# Patient Record
Sex: MALE | Race: WHITE
[De-identification: names, ages, dates, MRNs, and addresses within clinical notes are randomized per-mention and may not be internally consistent; named-entity substitution may affect disease eponyms.]

---

## 2019-07-22 ENCOUNTER — HOSPITAL ENCOUNTER (INPATIENT)
Dept: HOSPITAL 61 - ER | Age: 39
LOS: 1 days | Discharge: LEFT BEFORE DISCHARGE | DRG: 915 | End: 2019-07-23
Attending: INTERNAL MEDICINE | Admitting: INTERNAL MEDICINE
Payer: SELF-PAY

## 2019-07-22 VITALS — SYSTOLIC BLOOD PRESSURE: 131 MMHG | DIASTOLIC BLOOD PRESSURE: 80 MMHG

## 2019-07-22 VITALS — SYSTOLIC BLOOD PRESSURE: 143 MMHG | DIASTOLIC BLOOD PRESSURE: 78 MMHG

## 2019-07-22 VITALS — WEIGHT: 265 LBS | HEIGHT: 74 IN | BODY MASS INDEX: 34.01 KG/M2

## 2019-07-22 DIAGNOSIS — D72.829: ICD-10-CM

## 2019-07-22 DIAGNOSIS — T78.2XXA: Primary | ICD-10-CM
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
## 2019-07-22 DIAGNOSIS — N17.0: ICD-10-CM

## 2019-07-22 DIAGNOSIS — Z82.49: ICD-10-CM

## 2019-07-22 DIAGNOSIS — Z53.21: ICD-10-CM

## 2019-07-22 DIAGNOSIS — T78.1XXA: ICD-10-CM

## 2019-07-22 DIAGNOSIS — E66.3: ICD-10-CM

## 2019-07-22 DIAGNOSIS — L50.0: ICD-10-CM

## 2019-07-22 DIAGNOSIS — G89.29: ICD-10-CM

## 2019-07-22 DIAGNOSIS — M54.5: ICD-10-CM

## 2019-07-22 DIAGNOSIS — F17.210: ICD-10-CM

## 2019-07-22 LAB
% BANDS: 9 % (ref 0–9)
% LYMPHS: 5 % (ref 24–48)
% MONOS: 1 % (ref 0–10)
% SEGS: 85 % (ref 35–66)
ALBUMIN SERPL-MCNC: 3.7 G/DL (ref 3.4–5)
ALBUMIN/GLOB SERPL: 1.1 {RATIO} (ref 1–1.7)
ALP SERPL-CCNC: 79 U/L (ref 46–116)
ALT SERPL-CCNC: 37 U/L (ref 16–63)
ANION GAP SERPL CALC-SCNC: 10 MMOL/L (ref 6–14)
AST SERPL-CCNC: 21 U/L (ref 15–37)
BASOPHILS # BLD AUTO: 0 X10^3/UL (ref 0–0.2)
BASOPHILS NFR BLD: 0 % (ref 0–3)
BILIRUB SERPL-MCNC: 0.4 MG/DL (ref 0.2–1)
BUN SERPL-MCNC: 13 MG/DL (ref 8–26)
BUN/CREAT SERPL: 9 (ref 6–20)
CALCIUM SERPL-MCNC: 8.8 MG/DL (ref 8.5–10.1)
CHLORIDE SERPL-SCNC: 109 MMOL/L (ref 98–107)
CO2 SERPL-SCNC: 24 MMOL/L (ref 21–32)
CREAT SERPL-MCNC: 1.4 MG/DL (ref 0.7–1.3)
EOSINOPHIL NFR BLD: 0 % (ref 0–3)
EOSINOPHIL NFR BLD: 0 X10^3/UL (ref 0–0.7)
ERYTHROCYTE [DISTWIDTH] IN BLOOD BY AUTOMATED COUNT: 12.3 % (ref 11.5–14.5)
GFR SERPLBLD BASED ON 1.73 SQ M-ARVRAT: 55.8 ML/MIN
GLOBULIN SER-MCNC: 3.4 G/DL (ref 2.2–3.8)
GLUCOSE SERPL-MCNC: 119 MG/DL (ref 70–99)
HCT VFR BLD CALC: 46.5 % (ref 39–53)
HGB BLD-MCNC: 16.2 G/DL (ref 13–17.5)
LYMPHOCYTES # BLD: 1.5 X10^3/UL (ref 1–4.8)
LYMPHOCYTES NFR BLD AUTO: 6 % (ref 24–48)
MCH RBC QN AUTO: 34 PG (ref 25–35)
MCHC RBC AUTO-ENTMCNC: 35 G/DL (ref 31–37)
MCV RBC AUTO: 97 FL (ref 79–100)
MONO #: 0.6 X10^3/UL (ref 0–1.1)
MONOCYTES NFR BLD: 2 % (ref 0–9)
NEUT #: 22.8 X10^3/UL (ref 1.8–7.7)
NEUTROPHILS NFR BLD AUTO: 91 % (ref 31–73)
PLATELET # BLD AUTO: 222 X10^3/UL (ref 140–400)
PLATELET # BLD EST: ADEQUATE 10*3/UL
POTASSIUM SERPL-SCNC: 4.5 MMOL/L (ref 3.5–5.1)
PROT SERPL-MCNC: 7.1 G/DL (ref 6.4–8.2)
RBC # BLD AUTO: 4.81 X10^6/UL (ref 4.3–5.7)
SODIUM SERPL-SCNC: 143 MMOL/L (ref 136–145)
WBC # BLD AUTO: 24.9 X10^3/UL (ref 4–11)

## 2019-07-22 PROCEDURE — 96374 THER/PROPH/DIAG INJ IV PUSH: CPT

## 2019-07-22 PROCEDURE — 85007 BL SMEAR W/DIFF WBC COUNT: CPT

## 2019-07-22 PROCEDURE — 80048 BASIC METABOLIC PNL TOTAL CA: CPT

## 2019-07-22 PROCEDURE — 96372 THER/PROPH/DIAG INJ SC/IM: CPT

## 2019-07-22 PROCEDURE — 96375 TX/PRO/DX INJ NEW DRUG ADDON: CPT

## 2019-07-22 PROCEDURE — 93005 ELECTROCARDIOGRAM TRACING: CPT

## 2019-07-22 PROCEDURE — 85025 COMPLETE CBC W/AUTO DIFF WBC: CPT

## 2019-07-22 PROCEDURE — 80053 COMPREHEN METABOLIC PANEL: CPT

## 2019-07-22 PROCEDURE — 96361 HYDRATE IV INFUSION ADD-ON: CPT

## 2019-07-22 PROCEDURE — 36415 COLL VENOUS BLD VENIPUNCTURE: CPT

## 2019-07-22 PROCEDURE — 81001 URINALYSIS AUTO W/SCOPE: CPT

## 2019-07-22 NOTE — EKG
[facility]

              [address] [place] [address]

Test Date:    2019               Test Time:    14:24:46

Pat Name:     MAGDA MORALES            Department:   

Patient ID:   [id number]           Room:          

Gender:                               Technician:   

:          1980               Requested By: LEORA WELCH

Order Number: [id number]           Reading MD:     

                                 Measurements

Intervals                              Axis          

Rate:         90                       P:            45

IA:           152                      QRS:          42

QRSD:         78                       T:            -24

QT:           350                                    

QTc:          432                                    

                           Interpretive Statements

SINUS RHYTHM

QRS(T) CONTOUR ABNORMALITY

CONSIDER ANTEROSEPTAL MYOCARDIAL DAMAGE

T ABNORMALITY IN INFERIOR LEADS

ABNORMAL ECG

RI6.01          Unconfirmed report

No previous ECG available for comparison

## 2019-07-22 NOTE — PDOC1
History and Physical


Date of Admission


Date of Admission


DATE: 7/22/19 


TIME: 18:25





Identification/Chief Complaint


Chief Complaint


Allergic reaction





Source


Source:  Patient





History of Present Illness


History of Present Illness


Mr España is a 38yo M w/ PMHx chronic back pain who presents to ER via EMS 

following an allergic reaction.





Per EMS they were called as patient developed hives and had drowsiness, upper 

lip and facial swelling and some confusion following onset of symptoms while he 

was driving down the road after lunch. On arrival they found patient to be 

hypotensive and tachycardic. They administered 0.3 IM epinephrine and 50 mg IV 

Benadryl. With minimal improvement they administered 10 mcg epinephrine IV and 

then had improved vital signs. On arrival patient is A&Ox3 denying 

CP/SOA/difficulty swallowing. Patient has diffuse hives on the lower abdomen and

extremities.





Patient's airway is patent with no pharyngeal swelling or erythema-uvula 

midline. Patient is speaking in clear full sentences.





Patient reports he felt fine prior to eating tacos at lunch and then while they 

were driving had sudden onset of the symptoms.





He works as an  in [place] and notes the rash began on his abdomen 

along where his utility belt is normally. He also notes similar symptoms on the 

4th of July that were much milder with minimal facial swelling and abdominal 

rash.





Notable for large leukocytosis and Cr elevated with no history of renal disease.





Past Medical History


Cardiovascular:  No pertinent hx


Pulmonary:  No pertinent hx


GI:  No pertinent hx


Heme/Onc:  No pertinent hx


Hepatobiliary:  No pertinent hx


Psych:  No pertinent hx


Musculoskeletal:   low back pain


Rheumatologic:  No pertinent hx


Infectious disease:  No pertinent hx


ENT:  No pertinent hx


Renal/:  No pertinent hx


Endocrine:  No pertinent hx


Dermatology:  No pertinent hx





Past Surgical History


Past Surgical History:  Other (Lumbar fixation)





Family History


Family History:  High Cholestrol, Hypertension





Social History


Smoke:  <1 pack per day


ALCOHOL:  rare


Drugs:  None





Current Medications


Current Medications





Current Medications


Methylprednisolone Sodium Succinate (SOLU-Medrol 125MG VIAL) 125 mg 1X  ONCE IV 

Last administered on 7/22/19at 14:30;  Start 7/22/19 at 14:30;  Stop 7/22/19 at 

14:41;  Status DC


Famotidine (Pepcid Vial) 40 mg 1X  ONCE IVP  Last administered on 7/22/19at 

14:30;  Start 7/22/19 at 14:30;  Stop 7/22/19 at 14:41;  Status DC


Sodium Chloride 1,000 ml @  1,000 mls/hr 1X  ONCE IV  Last administered on 

7/22/19at 14:31;  Start 7/22/19 at 14:30;  Stop 7/22/19 at 15:29;  Status DC


Epinephrine HCl (EPINEPHrine SYRINGE) 0.3 mg 1X  ONCE IM  Last administered on 

7/22/19at 18:04;  Start 7/22/19 at 17:45;  Stop 7/22/19 at 18:06;  Status DC


Epinephrine HCl (Adrenalin) 0.3 mg 1X  ONCE IM ;  Start 7/22/19 at 18:15;  Stop 

7/22/19 at 18:16;  Status DC





Allergies


Allergies:  


Coded Allergies:  


     No Known Drug Allergies (Unverified , 7/22/19)





ROS


General:  YES: Fatigue, Malaise; 


   No: Chills, Night Sweats, Appetite, Other


PSYCHOLOGICAL ROS:  No: Anxiety, Behavioral Disorder, Concentration difficultie,

Decreased libido, Depression, Disorientation, Hallucinations, Hostility, 

Irritablity, Memory difficulties, Mood Swings, Obsessive thoughts, Physical 

abuse, Sexual abuse, Sleep disturbances, Suicidal ideation, Other


Eyes:  No Blurry vision, No Decreased vision, No Double vision, No Dry eyes, No 

Excessive tearing, No Eye Pain, No Itchy Eyes, No Loss of vision, No 

Photophobia, No Scotomata, No Uses contacts, No Uses glasses, No Other


HEENT:  No: Heacaches, Visual Changes, Hearing change, Nasal congestion, Nasal 

discharge, Oral lesions, Sinus pain, Sore Throat, Epistaxis, Sneezing, Snoring, 

Tinnitus, Vertigo, Vocal changes, Other


ALLERGY AND IMMUNOLOGY:  YES: Hives, Insect Bite Sensitivity, Itchy/Watery Eyes


Hematological and Lymphatic:  No: Bleeding Problems, Blood Clots, Blood 

Transfusions, Brusing, Night Sweats, Pallor, Swollen Lymph Nodes, Other


ENDOCRINE:  No: Breast Changes, Galactorrhea, Hair Pattern Changes, Hot Flashes,

Malaise/lethargy, Mood Swings, Palpitations, Polydipsia/polyuria, Skin Changes, 

Temperature Intolerance, Unexpected Weight Changes, Other


Breast:  No New/Changing Breast Lumps, No Nipple changes, No Nipple discharge, 

No Other


Respiratory:  No: Cough, Hemoptysis, Orthopnea, Pleuritic Pain, Shortness of 

breath, SOB with excertion, Sputum Changes, Stridor, Tachypnea, Wheezing, Other


Cardiovascular:  No Chest Pain, No Palpitations, No Orthopnea, No Paroxysmal 

Noc. Dyspnea, No Edema, No Lt Headedness, No Other


Gastrointestinal:  No Nausea, No Vomiting, No Abdominal Pain, No Diarrhea, No C

onstipation, No Melena, No Hematochezia, No Other


Genitourinary:  No Dysuria, No Frequency, No Incontinence, No Hematuria, No 

Retention, No Discharge, No Urgency, No Pain, No Flank Pain, No Other, No , No ,

No , No , No , No , No 


Musculoskeletal:  No Gait Disturbance, No Joint Pain, No Joint Stiffness, No 

Joint Swelling, No Muscle Pain, No Muscular Weakness, No Pain In:, No Swelling 

In:, No Other


Neurological:  No Behavorial Changes, No Bowel/Bladder ControlChng, No 

Confusion, No Dizziness, No Gait Disturbance, No Headaches, No Impaired 

Coord/balance, No Memory Loss, No Numbness/Tingling, No Seizures, No Speech 

Problems, No Tremors, No Visual Changes, No Weakness, No Other


Skin:  No Dry Skin, No Eczema, No Hair Changes, No Lumps, No Mole Changes, No 

Mottling, No Nail Changes, No Pruritus, No Rash, No Skin Lesion Changes, No 

Other, No Acne





Physical Exam


General:  Alert, Oriented X3, Cooperative, No acute distress


HEENT:  Atraumatic, PERRLA, EOMI, Mucous membr. moist/pink, Other (Upper lip 

slightly swollen)


Lungs:  Clear to auscultation, Normal air movement


Heart:  S1S2, RRR, no gallops, no murmurs


Abdomen:  Normal bowel sounds, Soft, No tenderness, No hepatosplenomegaly, No 

masses


Male Genitals Exam:  normal genitalia


Rectal Exam:  not examined


Extremities:  No clubbing, No cyanosis, No edema, Normal pulses, No 

tenderness/swelling


Skin:  No breakdown, No significant lesion, Other (Confluent raised hives on 

bilateral arms and abdomen, neck, thighs and back)


Neuro:  Normal gait, Normal speech, Strength at 5/5 X4 ext, Normal tone, 

Sensation intact, Cranial nerves 3-12 NL, Reflexes 2+


Psych/Mental Status:  Mental status NL, Mood NL





Vitals


Vitals





Vital Signs








  Date Time  Temp Pulse Resp B/P (MAP) Pulse Ox O2 Delivery O2 Flow Rate FiO2


 


7/22/19 16:15  78 17 146/85 (105) 99 Room Air  


 


7/22/19 15:00       2.0 


 


7/22/19 14:17 98.7       





 98.7       











VTE Prophylaxis Ordered


VTE Prophylaxis Devices:  Yes


VTE Pharmacological Prophylaxi:  No





Assessment/Plan


Assessment/Plan


A/P:


Hives - likely a food allergy, though [place] 

[place] he may have a mold or fungal allergy he is developing. He is rather 

insistent about leaving, I have advised him he needs allergist follow up and 

inpatient overnight as he had a severe reaction. Start montelukast nightly.


Anaphylaxis - noted outside the hospital, with hives recurring, given 1x 0.3mg 

IM epinephrine in ED, IV benadryl, solumedrol 125mg, and pepcid IV


Chronic lower back pain - heating pad, tylenol


Smoker - he will quit cold turkey, has started vaping minimally.


Overweight - counseled on weight loss, diet, exercise


Leukocytosis - likely neutrophil demargination 2/2 epinephrine and steroid 

administration, will repeat CBC in AM


CLYDE - likely vasomotor nephropathy from low PO intake, epi administration, will 

hydrate overnight with LR. repeat BMP in AM





FEN - General diet


PPX - pepcid, ambulatory, low DVT risk


FULL CODE


Inpatient for anaphylaxis. May be able to d/c in next 24-48 hours.











IAIN SHEN MD        Jul 22, 2019 18:27

## 2019-07-22 NOTE — PHYS DOC
Adult General


Chief Complaint


Chief Complaint:  ALLERGIC REACTION





HPI


HPI





39-year-old male presents to ER via EMS following an allergic reaction to 

possible dairy food. Per EMS they were called as patient developed hives and had

decreased LOC following onset of symptoms while he was driving down the road 

after lunch. On arrival they found patient to be hypotensive and tachycardic. 

They administered 0.3 IM epinephrine and 50 mg IV Benadryl. With minimal 

improvement they administered 10 mcg epinephrine IV and then had improved vital 

signs. On arrival patient is A&Ox3 denying CP/SOA/difficulty swallowing. Patient

has diffuse hives on the lower abdomen and extremities. Patient's airway is 

patent with no pharyngeal swelling or erythema-uvula midline. Patient is 

speaking in clear full sentences. Patient reports he felt fine prior to eating 

tacos at lunch and then while they were driving had sudden onset of the 

symptoms.





Review of Systems


Review of Systems





Constitutional: Denies fever or chills. Reports generalized fatigue


Eyes: Denies change in visual acuity, redness, or eye pain []


HENT: Denies throat pain/swelling


Respiratory: Denies cough or shortness of breath []


Cardiovascular: Denies CP/tightness


GI: Denies abdominal pain, nausea, vomiting, bloody stools or diarrhea []


: Denies urinary sxs


Musculoskeletal: Denies back/neck pain or joint pain []


Integument: Reports hives


Neurologic: Denies headache, focal weakness or sensory changes []





All other systems were reviewed and found to be within normal limits, except as 

documented in this note.





Current Medications


Current Medications





Current Medications








 Medications


  (Trade)  Dose


 Ordered  Sig/Ayanna  Start Time


 Stop Time Status Last Admin


Dose Admin


 


 Famotidine


  (Pepcid Vial)  40 mg  1X  ONCE  7/22/19 14:30


 7/22/19 14:41 DC 7/22/19 14:30


40 MG


 


 Methylprednisolone


 Sodium Succinate


  (SOLU-Medrol


 125MG VIAL)  125 mg  1X  ONCE  7/22/19 14:30


 7/22/19 14:41 DC 7/22/19 14:30


125 MG


 


 Sodium Chloride  1,000 ml @ 


 1,000 mls/hr  1X  ONCE  7/22/19 14:30


 7/22/19 15:29 DC 7/22/19 14:31


1,000 MLS/HR











Allergies


Allergies





Allergies








Coded Allergies Type Severity Reaction Last Updated Verified


 


  No Known Drug Allergies    7/22/19 No











Physical Exam


Physical Exam





Constitutional: Well developed, well nourished, no acute distress, non-toxic 

appearance. []


HENT: Normocephalic, atraumatic, bilateral external ears normal, oropharynx 

moist, no oral exudates, nose normal. []


Eyes: PERRLA, EOMI, conjunctiva normal, no discharge. [] 


Neck: Normal range of motion, no tenderness, supple, no stridor. [] 


Cardiovascular:Heart rate regular rhythm, no murmur []


Lungs & Thorax:  Bilateral breath sounds clear to auscultation []


Abdomen: Bowel sounds normal, soft, no tenderness, no masses, no pulsatile 

masses. [] 


Skin: Warm, dry, no erythema, no rash. [] 


Back: No tenderness, no CVA tenderness. [] 


Extremities: No tenderness, no cyanosis, no clubbing, ROM intact, no edema. [] 


Neurologic: Alert and oriented X 3, normal motor function, normal sensory 

function, no focal deficits noted. []


Psychologic: Affect normal, judgement normal, mood normal. []





Current Patient Data


Vital Signs





                                   Vital Signs








  Date Time  Temp Pulse Resp B/P (MAP) Pulse Ox O2 Delivery O2 Flow Rate FiO2


 


7/22/19 16:15  78 17 146/85 (105) 99 Room Air  


 


7/22/19 15:00       2.0 


 


7/22/19 14:17 98.7       





 98.7       











EKG


EKG


EKG obtained 07/22/19 at 1424


Interpreted by Dr. Schrader





Sinus rhythm


Rate 90


No STEMI





Radiology/Procedures


Radiology/Procedures


[]





Course & Med Decision Making


Course & Med Decision Making


Pertinent Labs reviewed. (See chart for details)





[]





Dragon Disclaimer


Dragon Disclaimer


This electronic medical record was generated, in whole or in part, using a voice

 recognition dictation system.





Departure


Departure


Impression:  


   Primary Impression:  


   Allergic reaction


Disposition:  09 ADMITTED AS INPATIENT


Admitting Physician:  HIMS


Condition:  STABLE











ADENLEORA SCHERER APRLESLYE          Jul 22, 2019 14:31

## 2019-07-23 VITALS — DIASTOLIC BLOOD PRESSURE: 69 MMHG | SYSTOLIC BLOOD PRESSURE: 131 MMHG

## 2019-07-23 LAB
ANION GAP SERPL CALC-SCNC: 9 MMOL/L (ref 6–14)
APTT PPP: YELLOW S
BACTERIA #/AREA URNS HPF: 0 /HPF
BASOPHILS # BLD AUTO: 0 X10^3/UL (ref 0–0.2)
BASOPHILS NFR BLD: 0 % (ref 0–3)
BILIRUB UR QL STRIP: NEGATIVE
BUN SERPL-MCNC: 14 MG/DL (ref 8–26)
CALCIUM SERPL-MCNC: 8.8 MG/DL (ref 8.5–10.1)
CHLORIDE SERPL-SCNC: 107 MMOL/L (ref 98–107)
CO2 SERPL-SCNC: 26 MMOL/L (ref 21–32)
CREAT SERPL-MCNC: 1 MG/DL (ref 0.7–1.3)
EOSINOPHIL NFR BLD: 0 % (ref 0–3)
EOSINOPHIL NFR BLD: 0 X10^3/UL (ref 0–0.7)
ERYTHROCYTE [DISTWIDTH] IN BLOOD BY AUTOMATED COUNT: 12.3 % (ref 11.5–14.5)
FIBRINOGEN PPP-MCNC: CLEAR MG/DL
GFR SERPLBLD BASED ON 1.73 SQ M-ARVRAT: 82.3 ML/MIN
GLUCOSE SERPL-MCNC: 119 MG/DL (ref 70–99)
HCT VFR BLD CALC: 42.5 % (ref 39–53)
HGB BLD-MCNC: 14.8 G/DL (ref 13–17.5)
LYMPHOCYTES # BLD: 0.8 X10^3/UL (ref 1–4.8)
LYMPHOCYTES NFR BLD AUTO: 5 % (ref 24–48)
MCH RBC QN AUTO: 34 PG (ref 25–35)
MCHC RBC AUTO-ENTMCNC: 35 G/DL (ref 31–37)
MCV RBC AUTO: 98 FL (ref 79–100)
MONO #: 0.2 X10^3/UL (ref 0–1.1)
MONOCYTES NFR BLD: 1 % (ref 0–9)
NEUT #: 15.5 X10^3/UL (ref 1.8–7.7)
NEUTROPHILS NFR BLD AUTO: 94 % (ref 31–73)
NITRITE UR QL STRIP: NEGATIVE
PH UR STRIP: 5.5 [PH]
PLATELET # BLD AUTO: 212 X10^3/UL (ref 140–400)
POTASSIUM SERPL-SCNC: 4.5 MMOL/L (ref 3.5–5.1)
PROT UR STRIP-MCNC: NEGATIVE MG/DL
RBC # BLD AUTO: 4.36 X10^6/UL (ref 4.3–5.7)
RBC #/AREA URNS HPF: (no result) /HPF (ref 0–2)
SODIUM SERPL-SCNC: 142 MMOL/L (ref 136–145)
SQUAMOUS #/AREA URNS LPF: (no result) /LPF
UROBILINOGEN UR-MCNC: 1 MG/DL
WBC # BLD AUTO: 16.5 X10^3/UL (ref 4–11)
WBC #/AREA URNS HPF: (no result) /HPF (ref 0–4)

## 2019-07-23 NOTE — NUR
Pt states having to leave and go to work. Pt rested during the noc with eyes closed. Pt with 
LR at 75 going during the noc. No complaints noted and swelling of face has reduced. Pt 
states feeling better. Pt states he has not missed work in 23 years and does not want to 
start. Pt given prescriptions that Physician wants him to feel and instructed to report to 
Ed for complications. Pt signed AMA paperwork. Peripheral IV removed, pt able to ambulate 
without assistance.

## 2019-07-23 NOTE — PDOC3
Discharge Summary


Visit Information


Date of Admission:  Jul 22, 2019


Date of Discharge:  Jul 23, 2019


Admitting Diagnosis:  Anaphylaxis


Final Diagnosis


Problems


Medical Problems:


(1) Allergic reaction


Status: Acute  











Brief Hospital Course


Allergies





                                    Allergies








Coded Allergies Type Severity Reaction Last Updated Verified


 


  No Known Drug Allergies    7/22/19 No








Vital Signs





Vital Signs








  Date Time  Temp Pulse Resp B/P (MAP) Pulse Ox O2 Delivery O2 Flow Rate FiO2


 


7/23/19 03:35 98.6 75 18 131/69 (89) 100 Room Air  





 98.6       


 


7/22/19 20:00       2.0 








Lab Results





Laboratory Tests








Test


 7/22/19


18:15 7/23/19


03:15 7/23/19


03:20


 


White Blood Count


 24.9 x10^3/uL


(4.0-11.0) 16.5 x10^3/uL


(4.0-11.0) 





 


Red Blood Count


 4.81 x10^6/uL


(4.30-5.70) 4.36 x10^6/uL


(4.30-5.70) 





 


Hemoglobin


 16.2 g/dL


(13.0-17.5) 14.8 g/dL


(13.0-17.5) 





 


Hematocrit


 46.5 %


(39.0-53.0) 42.5 %


(39.0-53.0) 





 


Mean Corpuscular Volume 97 fL ()  98 fL ()  


 


Mean Corpuscular Hemoglobin 34 pg (25-35)  34 pg (25-35)  


 


Mean Corpuscular Hemoglobin


Concent 35 g/dL


(31-37) 35 g/dL


(31-37) 





 


Red Cell Distribution Width


 12.3 %


(11.5-14.5) 12.3 %


(11.5-14.5) 





 


Platelet Count


 222 x10^3/uL


(140-400) 212 x10^3/uL


(140-400) 





 


Neutrophils (%) (Auto) 91 % (31-73)  94 % (31-73)  


 


Lymphocytes (%) (Auto) 6 % (24-48)  5 % (24-48)  


 


Monocytes (%) (Auto) 2 % (0-9)  1 % (0-9)  


 


Eosinophils (%) (Auto) 0 % (0-3)  0 % (0-3)  


 


Basophils (%) (Auto) 0 % (0-3)  0 % (0-3)  


 


Neutrophils # (Auto)


 22.8 x10^3/uL


(1.8-7.7) 15.5 x10^3/uL


(1.8-7.7) 





 


Lymphocytes # (Auto)


 1.5 x10^3/uL


(1.0-4.8) 0.8 x10^3/uL


(1.0-4.8) 





 


Monocytes # (Auto)


 0.6 x10^3/uL


(0.0-1.1) 0.2 x10^3/uL


(0.0-1.1) 





 


Eosinophils # (Auto)


 0.0 x10^3/uL


(0.0-0.7) 0.0 x10^3/uL


(0.0-0.7) 





 


Basophils # (Auto)


 0.0 x10^3/uL


(0.0-0.2) 0.0 x10^3/uL


(0.0-0.2) 





 


Segmented Neutrophils % 85 % (35-66)   


 


Band Neutrophils % 9 % (0-9)   


 


Lymphocytes % 5 % (24-48)   


 


Monocytes % 1 % (0-10)   


 


Platelet Estimate


 Adequate


(ADEQUATE) 


 





 


Sodium Level


 143 mmol/L


(136-145) 142 mmol/L


(136-145) 





 


Potassium Level


 4.5 mmol/L


(3.5-5.1) 4.5 mmol/L


(3.5-5.1) 





 


Chloride Level


 109 mmol/L


() 107 mmol/L


() 





 


Carbon Dioxide Level


 24 mmol/L


(21-32) 26 mmol/L


(21-32) 





 


Anion Gap 10 (6-14)  9 (6-14)  


 


Blood Urea Nitrogen


 13 mg/dL


(8-26) 14 mg/dL


(8-26) 





 


Creatinine


 1.4 mg/dL


(0.7-1.3) 1.0 mg/dL


(0.7-1.3) 





 


Estimated GFR


(Cockcroft-Gault) 55.8 


 82.3 


 





 


BUN/Creatinine Ratio 9 (6-20)   


 


Glucose Level


 119 mg/dL


(70-99) 119 mg/dL


(70-99) 





 


Calcium Level


 8.8 mg/dL


(8.5-10.1) 8.8 mg/dL


(8.5-10.1) 





 


Total Bilirubin


 0.4 mg/dL


(0.2-1.0) 


 





 


Aspartate Amino Transf


(AST/SGOT) 21 U/L (15-37) 


 


 





 


Alanine Aminotransferase


(ALT/SGPT) 37 U/L (16-63) 


 


 





 


Alkaline Phosphatase


 79 U/L


() 


 





 


Total Protein


 7.1 g/dL


(6.4-8.2) 


 





 


Albumin


 3.7 g/dL


(3.4-5.0) 


 





 


Albumin/Globulin Ratio 1.1 (1.0-1.7)   


 


Urine Collection Type   Unknown 


 


Urine Color   Yellow 


 


Urine Clarity   Clear 


 


Urine pH   5.5 


 


Urine Specific Gravity   1.025 


 


Urine Protein


 


 


 Negative mg/dL


(NEG-TRACE)


 


Urine Glucose (UA)


 


 


 Negative mg/dL


(NEG)


 


Urine Ketones (Stick)


 


 


 Negative mg/dL


(NEG)


 


Urine Blood   Negative (NEG) 


 


Urine Nitrite   Negative (NEG) 


 


Urine Bilirubin   Negative (NEG) 


 


Urine Urobilinogen Dipstick


 


 


 1.0 mg/dL (0.2


mg/dL)


 


Urine Leukocyte Esterase   Negative (NEG) 


 


Urine RBC   Occ /HPF (0-2) 


 


Urine WBC   Occ /HPF (0-4) 


 


Urine Squamous Epithelial


Cells 


 


 Occ /LPF 





 


Urine Bacteria   0 /HPF (0-FEW) 


 


Urine Mucus   Mod /LPF 








Laboratory Tests








Test


 7/22/19


18:15 7/23/19


03:15 7/23/19


03:20


 


White Blood Count


 24.9 x10^3/uL


(4.0-11.0) 16.5 x10^3/uL


(4.0-11.0) 





 


Red Blood Count


 4.81 x10^6/uL


(4.30-5.70) 4.36 x10^6/uL


(4.30-5.70) 





 


Hemoglobin


 16.2 g/dL


(13.0-17.5) 14.8 g/dL


(13.0-17.5) 





 


Hematocrit


 46.5 %


(39.0-53.0) 42.5 %


(39.0-53.0) 





 


Mean Corpuscular Volume 97 fL ()  98 fL ()  


 


Mean Corpuscular Hemoglobin 34 pg (25-35)  34 pg (25-35)  


 


Mean Corpuscular Hemoglobin


Concent 35 g/dL


(31-37) 35 g/dL


(31-37) 





 


Red Cell Distribution Width


 12.3 %


(11.5-14.5) 12.3 %


(11.5-14.5) 





 


Platelet Count


 222 x10^3/uL


(140-400) 212 x10^3/uL


(140-400) 





 


Neutrophils (%) (Auto) 91 % (31-73)  94 % (31-73)  


 


Lymphocytes (%) (Auto) 6 % (24-48)  5 % (24-48)  


 


Monocytes (%) (Auto) 2 % (0-9)  1 % (0-9)  


 


Eosinophils (%) (Auto) 0 % (0-3)  0 % (0-3)  


 


Basophils (%) (Auto) 0 % (0-3)  0 % (0-3)  


 


Neutrophils # (Auto)


 22.8 x10^3/uL


(1.8-7.7) 15.5 x10^3/uL


(1.8-7.7) 





 


Lymphocytes # (Auto)


 1.5 x10^3/uL


(1.0-4.8) 0.8 x10^3/uL


(1.0-4.8) 





 


Monocytes # (Auto)


 0.6 x10^3/uL


(0.0-1.1) 0.2 x10^3/uL


(0.0-1.1) 





 


Eosinophils # (Auto)


 0.0 x10^3/uL


(0.0-0.7) 0.0 x10^3/uL


(0.0-0.7) 





 


Basophils # (Auto)


 0.0 x10^3/uL


(0.0-0.2) 0.0 x10^3/uL


(0.0-0.2) 





 


Segmented Neutrophils % 85 % (35-66)   


 


Band Neutrophils % 9 % (0-9)   


 


Lymphocytes % 5 % (24-48)   


 


Monocytes % 1 % (0-10)   


 


Platelet Estimate


 Adequate


(ADEQUATE) 


 





 


Sodium Level


 143 mmol/L


(136-145) 142 mmol/L


(136-145) 





 


Potassium Level


 4.5 mmol/L


(3.5-5.1) 4.5 mmol/L


(3.5-5.1) 





 


Chloride Level


 109 mmol/L


() 107 mmol/L


() 





 


Carbon Dioxide Level


 24 mmol/L


(21-32) 26 mmol/L


(21-32) 





 


Anion Gap 10 (6-14)  9 (6-14)  


 


Blood Urea Nitrogen


 13 mg/dL


(8-26) 14 mg/dL


(8-26) 





 


Creatinine


 1.4 mg/dL


(0.7-1.3) 1.0 mg/dL


(0.7-1.3) 





 


Estimated GFR


(Cockcroft-Gault) 55.8 


 82.3 


 





 


BUN/Creatinine Ratio 9 (6-20)   


 


Glucose Level


 119 mg/dL


(70-99) 119 mg/dL


(70-99) 





 


Calcium Level


 8.8 mg/dL


(8.5-10.1) 8.8 mg/dL


(8.5-10.1) 





 


Total Bilirubin


 0.4 mg/dL


(0.2-1.0) 


 





 


Aspartate Amino Transf


(AST/SGOT) 21 U/L (15-37) 


 


 





 


Alanine Aminotransferase


(ALT/SGPT) 37 U/L (16-63) 


 


 





 


Alkaline Phosphatase


 79 U/L


() 


 





 


Total Protein


 7.1 g/dL


(6.4-8.2) 


 





 


Albumin


 3.7 g/dL


(3.4-5.0) 


 





 


Albumin/Globulin Ratio 1.1 (1.0-1.7)   


 


Urine Collection Type   Unknown 


 


Urine Color   Yellow 


 


Urine Clarity   Clear 


 


Urine pH   5.5 


 


Urine Specific Gravity   1.025 


 


Urine Protein


 


 


 Negative mg/dL


(NEG-TRACE)


 


Urine Glucose (UA)


 


 


 Negative mg/dL


(NEG)


 


Urine Ketones (Stick)


 


 


 Negative mg/dL


(NEG)


 


Urine Blood   Negative (NEG) 


 


Urine Nitrite   Negative (NEG) 


 


Urine Bilirubin   Negative (NEG) 


 


Urine Urobilinogen Dipstick


 


 


 1.0 mg/dL (0.2


mg/dL)


 


Urine Leukocyte Esterase   Negative (NEG) 


 


Urine RBC   Occ /HPF (0-2) 


 


Urine WBC   Occ /HPF (0-4) 


 


Urine Squamous Epithelial


Cells 


 


 Occ /LPF 





 


Urine Bacteria   0 /HPF (0-FEW) 


 


Urine Mucus   Mod /LPF 








Brief Hospital Course


Mr España is a 40yo M w/ PMHx chronic back pain who presented to ER via EMS 

following an allergic reaction.


Per EMS they were called as patient developed hives and had drowsiness, upper 

lip and facial swelling and some confusion following onset of symptoms while he 

was driving down the road after lunch. On arrival they found patient to be 

hypotensive and tachycardic. They administered 0.3 IM epinephrine and 50 mg IV 

Benadryl. With minimal improvement they administered 10 mcg epinephrine IV and 

then had improved vital signs. On arrival patient was A&Ox3 denying 

CP/SOA/difficulty swallowing. Patient had diffuse hives on the lower abdomen and

extremities. Daughter and wife bedsid.





Patient's airway was patent with no pharyngeal swelling or erythema-uvula 

midline. Patient was speaking in clear full sentences.





Patient reported he felt fine prior to eating tacos at lunch and then while 

driving had sudden onset of the symptoms.





He works as an  in [place] and noted the rash began on his abdomen 

along where his utility belt is normally. He also noted similar symptoms on the 

4th of July that were much milder with minimal facial swelling and abdominal 

rash.





Notable for large leukocytosis and Cr elevated with no history of renal disease.





Overnight he continued stable, no further problems and he left the hospital AMA 

at 0500 to leave for work.





A/P:


Hives - likely a food allergy, though [place] he may have a mold or fungal allergy he is developing. He is rather 

insistent about leaving, I have advised him he needs allergist follow up and 

inpatient overnight as he had a severe reaction. Start montelukast nightly.


Anaphylaxis - noted outside the hospital, with hives recurring, given 1x 0.3mg 

IM epinephrine in ED, IV benadryl, solumedrol 125mg, and pepcid IV


Chronic lower back pain - heating pad, tylenol


Smoker - he will quit cold turkey, has started vaping minimally.


Overweight - counseled on weight loss, diet, exercise


Leukocytosis - likely neutrophil demargination 2/2 epinephrine and steroid 

administration, will repeat CBC in AM


CLYDE - likely vasomotor nephropathy from low PO intake, epi administration, will 

hydrate overnight with LR. repeat BMP in AM





Greater than 30 minutes spent on discharge.





Discharge Information


Condition at Discharge:  Improved


Follow Up:  Weeks (1)


Disposition/Orders:  D/C to Home


Scheduled


Fexofenadine Hcl (Fexofenadine Hcl) 180 Mg Tablet, 1 TAB PO DAILY for Allergies 

for 90 Days, #90 Ref 3


   Prescribed by: IAIN SHEN MD on 7/22/19 2051


Montelukast Sodium (Montelukast Sodium Tablet  **) 10 Mg Tablet, 10 MG PO QHS 

for HIVES for 90 Days, #90 Ref 3


   Prescribed by: IAIN SHEN MD on 7/22/19 2051


Prednisone (Prednisone) 20 Mg Tablet, 20 MG PO DAILY for Allergic reaction for 

10 Days, #15


   40mg Daily for 5 days, then 20mg daily for 5 days 


   Prescribed by: IAIN SHEN MD on 7/22/19 2051





Scheduled PRN


[Epinephrine] 1 MG/1 ML VIAL, 0.3 MG IM PRN Q6HRS PRN for allergic reaction, #2 

Ref 5


   Please dispense syringe and needle #2 as well 


   Prescribed by: IAIN SHEN MD on 7/22/19 2051











IAIN SHEN MD        Jul 23, 2019 07:39